# Patient Record
Sex: MALE | Race: BLACK OR AFRICAN AMERICAN | NOT HISPANIC OR LATINO | URBAN - METROPOLITAN AREA
[De-identification: names, ages, dates, MRNs, and addresses within clinical notes are randomized per-mention and may not be internally consistent; named-entity substitution may affect disease eponyms.]

---

## 2021-12-14 ENCOUNTER — APPOINTMENT (RX ONLY)
Dept: URBAN - METROPOLITAN AREA CLINIC 66 | Facility: CLINIC | Age: 21
Setting detail: DERMATOLOGY
End: 2021-12-14

## 2021-12-14 DIAGNOSIS — L29.89 OTHER PRURITUS: ICD-10-CM

## 2021-12-14 DIAGNOSIS — L20.89 OTHER ATOPIC DERMATITIS: ICD-10-CM

## 2021-12-14 DIAGNOSIS — L29.8 OTHER PRURITUS: ICD-10-CM

## 2021-12-14 PROBLEM — L20.84 INTRINSIC (ALLERGIC) ECZEMA: Status: ACTIVE | Noted: 2021-12-14

## 2021-12-14 PROCEDURE — ? FULL BODY SKIN EXAM - DECLINED

## 2021-12-14 PROCEDURE — 99203 OFFICE O/P NEW LOW 30 MIN: CPT

## 2021-12-14 PROCEDURE — ? ADDITIONAL NOTES

## 2021-12-14 PROCEDURE — ? PRESCRIPTION

## 2021-12-14 PROCEDURE — ? COUNSELING

## 2021-12-14 RX ORDER — CLOBETASOL PROPIONATE 0.5 MG/G
CREAM TOPICAL BID
Qty: 60 | Refills: 2 | Status: ERX | COMMUNITY
Start: 2021-12-14

## 2021-12-14 RX ORDER — HYDROXYZINE HYDROCHLORIDE 10 MG/1
TABLET, FILM COATED ORAL QHS
Qty: 60 | Refills: 2 | Status: ERX | COMMUNITY
Start: 2021-12-14

## 2021-12-14 RX ADMIN — CLOBETASOL PROPIONATE: 0.5 CREAM TOPICAL at 00:00

## 2021-12-14 RX ADMIN — HYDROXYZINE HYDROCHLORIDE: 10 TABLET, FILM COATED ORAL at 00:00

## 2021-12-14 NOTE — PROCEDURE: ADDITIONAL NOTES
Detail Level: Detailed
Render Risk Assessment In Note?: no
Additional Notes: Gave patient written information on “The Do’s and Don’ts of Dry Skin”
